# Patient Record
Sex: FEMALE | Race: WHITE | NOT HISPANIC OR LATINO | Employment: FULL TIME | URBAN - METROPOLITAN AREA
[De-identification: names, ages, dates, MRNs, and addresses within clinical notes are randomized per-mention and may not be internally consistent; named-entity substitution may affect disease eponyms.]

---

## 2023-03-24 LAB — SARS-COV-2 RESULT: NOT DETECTED

## 2023-11-21 ENCOUNTER — LAB REQUISITION (OUTPATIENT)
Dept: LAB | Facility: HOSPITAL | Age: 53
End: 2023-11-21
Payer: COMMERCIAL

## 2023-11-21 LAB — SARS-COV-2 RNA RESP QL NAA+PROBE: DETECTED

## 2023-11-21 PROCEDURE — 87635 SARS-COV-2 COVID-19 AMP PRB: CPT

## 2023-11-29 ENCOUNTER — TELEPHONE (OUTPATIENT)
Dept: DERMATOLOGY | Facility: CLINIC | Age: 53
End: 2023-11-29
Payer: COMMERCIAL

## 2023-12-01 ENCOUNTER — OFFICE VISIT (OUTPATIENT)
Dept: DERMATOLOGY | Facility: CLINIC | Age: 53
End: 2023-12-01
Payer: COMMERCIAL

## 2023-12-01 DIAGNOSIS — D48.5 NEOPLASM OF UNCERTAIN BEHAVIOR OF SKIN: Primary | ICD-10-CM

## 2023-12-01 DIAGNOSIS — D18.01 HEMANGIOMA OF SKIN: ICD-10-CM

## 2023-12-01 DIAGNOSIS — D22.4 MELANOCYTIC NEVUS OF NECK: ICD-10-CM

## 2023-12-01 DIAGNOSIS — L81.4 LENTIGO: ICD-10-CM

## 2023-12-01 DIAGNOSIS — L57.8 DIFFUSE PHOTODAMAGE OF SKIN: ICD-10-CM

## 2023-12-01 DIAGNOSIS — L73.9 FOLLICULITIS: ICD-10-CM

## 2023-12-01 PROCEDURE — 99214 OFFICE O/P EST MOD 30 MIN: CPT | Performed by: DERMATOLOGY

## 2023-12-01 PROCEDURE — 88305 TISSUE EXAM BY PATHOLOGIST: CPT | Performed by: DERMATOLOGY

## 2023-12-01 PROCEDURE — 88305 TISSUE EXAM BY PATHOLOGIST: CPT | Mod: TC,DER | Performed by: DERMATOLOGY

## 2023-12-01 PROCEDURE — 11301 SHAVE SKIN LESION 0.6-1.0 CM: CPT | Performed by: DERMATOLOGY

## 2023-12-01 RX ORDER — ACETAMINOPHEN 500 MG
1 TABLET ORAL DAILY
COMMUNITY

## 2023-12-01 RX ORDER — ESTRADIOL 0.05 MG/D
PATCH TRANSDERMAL
COMMUNITY
Start: 2013-06-08 | End: 2024-05-16 | Stop reason: SDUPTHER

## 2023-12-01 RX ORDER — ATORVASTATIN CALCIUM 10 MG/1
10 TABLET, FILM COATED ORAL EVERY OTHER DAY
COMMUNITY
Start: 2023-06-26

## 2023-12-01 ASSESSMENT — DERMATOLOGY QUALITY OF LIFE (QOL) ASSESSMENT: ARE THERE EXCLUSIONS OR EXCEPTIONS FOR THE QUALITY OF LIFE ASSESSMENT: NO

## 2023-12-01 ASSESSMENT — DERMATOLOGY PATIENT ASSESSMENT
DO YOU USE A TANNING BED: YES, PREVIOUSLY
DO YOU USE SUNSCREEN: OCCASIONALLY
WHAT TYPE OF BIRTH CONTROL: PATCH
DO YOU HAVE IRREGULAR MENSTRUAL CYCLES: NO
ARE YOU ON BIRTH CONTROL: YES
WHERE ARE THESE NEW OR CHANGING LESIONS LOCATED: R SHOULDER
ARE YOU TRYING TO GET PREGNANT: NO
DO YOU HAVE ANY NEW OR CHANGING LESIONS: YES

## 2023-12-02 RX ORDER — CLINDAMYCIN PHOSPHATE 10 UG/ML
LOTION TOPICAL 2 TIMES DAILY
Qty: 60 ML | Refills: 11 | Status: SHIPPED | OUTPATIENT
Start: 2023-12-02 | End: 2024-12-01

## 2023-12-02 NOTE — PROGRESS NOTES
Subjective     Mindy Jha is a 53 y.o. female who presents for the following: Suspicious Skin Lesion (On right shoulder).  She states the brown spot on her right shoulder has been present for 1 year and has increased in size over that time and gotten darker in color.  It is asymptomatic with no associated bleeding, itching, or pain.  She also notes intermittent pimple-like breakouts on her neck and chest.  She denies any other new, changing, or concerning skin lesions; no bleeding, itching, or burning lesions.      Review of Systems:  No other skin or systemic complaints other than what is documented elsewhere in the note.    The following portions of the chart were reviewed this encounter and updated as appropriate:       Skin Cancer History  No skin cancer on file.    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     Allergies:  Oxycodone-acetaminophen and Azithromycin    Current Medications / CAM's:    Current Outpatient Medications:     atorvastatin (Lipitor) 10 mg tablet, Take 1 tablet (10 mg) by mouth every other day., Disp: , Rfl:     estradiol (Climara) 0.05 mg/24 hr patch, Place on the skin., Disp: , Rfl:     calcium carbonate-vitamin D3 600 mg-20 mcg (800 unit) tablet, Take 1 tablet by mouth once daily., Disp: , Rfl:     clindamycin (Cleocin T) 1 % lotion, Apply topically 2 times a day., Disp: 60 mL, Rfl: 11     Objective   Well appearing patient in no apparent distress; mood and affect are within normal limits.    A skin examination was performed including: Face, neck, chest, and bilateral shoulders.  She declined examination of any other parts of her skin today.  All findings within normal limits unless otherwise noted below.    Assessment/Plan   1.  Neoplasm of uncertain behavior of skin  Right Proximal Shoulder  6 mm dark brown pigmented, asymmetric, changing papule with an asymmetric pigment network and irregular borders           Shave removal    Lesion length (cm):  0.6  Margin per side (cm):  0  Lesion diameter (cm):  0.6  Informed consent: discussed and consent obtained    Timeout: patient name, date of birth, surgical site, and procedure verified    Procedure prep:  Patient was prepped and draped  Anesthesia: the lesion was anesthetized in a standard fashion    Anesthetic:  1% lidocaine w/ epinephrine 1-100,000 local infiltration  Instrument used: flexible razor blade    Hemostasis achieved with: aluminum chloride    Outcome: patient tolerated procedure well    Post-procedure details: sterile dressing applied and wound care instructions given    Dressing type: bandage and petrolatum      Staff Communication: Dermatology Local Anesthesia: 1 % Lidocaine / Epinephrine - Amount:0.5ml    Specimen 1 - Dermatopathology- DERM LAB  Differential Diagnosis: SK v DN  Check Margins Yes/No?:    Comments:    Dermpath Lab: Routine Histopathology (formalin-fixed tissue)    2. Folliculitis  Chest - Medial (Center)  Scattered on the patient's neck and chest, there are several follicular-based erythematous, inflammatory papules and pustules    Folliculitis -neck and chest.  The bacterial nature of this condition and treatment options were discussed with the patient today.  At this time, I recommend topical antibiotic therapy with Clindamycin 1% lotion, which the patient was instructed to apply twice daily to the affected areas or up to 3-4 times per day as needed for active lesions.  The risks, benefits, and side effects of this medication were discussed.  The patient expressed understanding and is in agreement with this plan.    clindamycin (Cleocin T) 1 % lotion - Chest - Medial (Center)  Apply topically 2 times a day.    3. Melanocytic nevus of neck  Scattered on the  patient's face, neck, chest, and bilateral shoulders, there are several small, round- to oval-shaped, brown-pigmented and pink-colored, symmetric, uniform-appearing macules and dome-shaped papules    Clinically benign- to slightly atypical-appearing nevi - the clinically benign- to slightly atypical-appearing nature of the remainder of the patient's nevi was discussed with the patient today.  None of the patient's nevi, with the exception of the one noted above, meet threshold for biopsy today.  I emphasized the importance of performing monthly self-skin exams using the ABCDs of monitoring moles, which were reviewed with the patient today and an informational hand-out provided.  I also emphasized the importance of sun avoidance and sun protection with daily sunscreen use.    4. Hemangioma of skin  Scattered on the patient's face, neck, chest, and bilateral shoulders, there are several small, round, cherry red- to purplish-colored, symmetric, uniform, vascular-appearing macules and papules    Cherry Angiomas - the benign nature of these vascular lesions was discussed with the patient today and reassurance provided.  No treatment is medically indicated for these lesions at this time.    5. Lentigo  Photodistributed  Multiple tan- to light brown-colored, round- to oval-shaped, symmetric and uniform-appearing macules and small patches consistent with lentigines scattered in sun-exposed areas.    Solar Lentigines and photodamage.  The clinically benign-appearing nature of these lesions and their relation to chronic sun exposure were discussed with the patient today and reassurance provided.  None of these lesions meet threshold for biopsy today, and thus no treatment is medically indicated for these lesions at this time.  The signs and symptoms of skin cancer were reviewed and the patient was advised to practice sun protection and sun avoidance, use daily sunscreen, and perform regular self skin exams.  The patient was  instructed to monitor these lesions for any changes, such as in size, shape, or color, or associated symptoms and to call our office to schedule a return visit for re-evaluation if any such changes or symptoms are noticed in the future.  The patient expressed understanding and is in agreement with this plan.    6. Diffuse photodamage of skin  Photodistributed  Diffuse photodamage with actinic changes with telangiectasia and mottled pigmentation in sun-exposed areas.    Photodamage.  The signs and symptoms of skin cancer were reviewed and the patient was advised to practice sun protection and sun avoidance, use daily sunscreen, and perform regular self skin exams.  Sun protection was discussed, including avoiding the mid-day sun, wearing a sunscreen with SPF at least 50, and stressing the need for reapplication of sunscreen and applying more than they think they need.

## 2023-12-05 LAB
LABORATORY COMMENT REPORT: NORMAL
PATH REPORT.FINAL DX SPEC: NORMAL
PATH REPORT.GROSS SPEC: NORMAL
PATH REPORT.MICROSCOPIC SPEC OTHER STN: NORMAL
PATH REPORT.RELEVANT HX SPEC: NORMAL
PATH REPORT.TOTAL CANCER: NORMAL

## 2024-04-10 ENCOUNTER — TELEPHONE (OUTPATIENT)
Dept: DERMATOLOGY | Facility: CLINIC | Age: 54
End: 2024-04-10
Payer: COMMERCIAL

## 2024-04-11 ENCOUNTER — APPOINTMENT (OUTPATIENT)
Dept: DERMATOLOGY | Facility: CLINIC | Age: 54
End: 2024-04-11
Payer: COMMERCIAL

## 2024-05-16 ENCOUNTER — OFFICE VISIT (OUTPATIENT)
Dept: OBSTETRICS AND GYNECOLOGY | Facility: CLINIC | Age: 54
End: 2024-05-16
Payer: COMMERCIAL

## 2024-05-16 VITALS
DIASTOLIC BLOOD PRESSURE: 81 MMHG | SYSTOLIC BLOOD PRESSURE: 159 MMHG | BODY MASS INDEX: 28.66 KG/M2 | WEIGHT: 172 LBS | HEIGHT: 65 IN

## 2024-05-16 DIAGNOSIS — N95.1 MENOPAUSAL SYMPTOMS: ICD-10-CM

## 2024-05-16 DIAGNOSIS — Z12.31 ENCOUNTER FOR SCREENING MAMMOGRAM FOR BREAST CANCER: ICD-10-CM

## 2024-05-16 DIAGNOSIS — Z01.419 ENCOUNTER FOR ANNUAL ROUTINE GYNECOLOGICAL EXAMINATION: Primary | ICD-10-CM

## 2024-05-16 LAB
POC BLOOD, URINE: NEGATIVE
POC GLUCOSE, URINE: NEGATIVE MG/DL
POC KETONES, URINE: NEGATIVE MG/DL
POC LEUKOCYTES, URINE: NEGATIVE
POC NITRITE,URINE: NEGATIVE
POC PROTEIN, URINE: NEGATIVE MG/DL

## 2024-05-16 PROCEDURE — 99396 PREV VISIT EST AGE 40-64: CPT | Performed by: OBSTETRICS & GYNECOLOGY

## 2024-05-16 PROCEDURE — 88175 CYTOPATH C/V AUTO FLUID REDO: CPT

## 2024-05-16 PROCEDURE — 87624 HPV HI-RISK TYP POOLED RSLT: CPT

## 2024-05-16 PROCEDURE — 81003 URINALYSIS AUTO W/O SCOPE: CPT | Performed by: OBSTETRICS & GYNECOLOGY

## 2024-05-16 RX ORDER — ESTRADIOL 0.05 MG/D
1 PATCH TRANSDERMAL
Qty: 12 PATCH | Refills: 3 | Status: SHIPPED | OUTPATIENT
Start: 2024-05-19 | End: 2025-05-19

## 2024-05-16 ASSESSMENT — PAIN SCALES - GENERAL: PAINLEVEL: 0-NO PAIN

## 2024-05-16 ASSESSMENT — ENCOUNTER SYMPTOMS
ENDOCRINE NEGATIVE: 0
EYES NEGATIVE: 0
LOSS OF SENSATION IN FEET: 0
NEUROLOGICAL NEGATIVE: 0
CONSTITUTIONAL NEGATIVE: 0
RESPIRATORY NEGATIVE: 0
GASTROINTESTINAL NEGATIVE: 0
CARDIOVASCULAR NEGATIVE: 0
HEMATOLOGIC/LYMPHATIC NEGATIVE: 0
MUSCULOSKELETAL NEGATIVE: 0
ALLERGIC/IMMUNOLOGIC NEGATIVE: 0
DEPRESSION: 0
PSYCHIATRIC NEGATIVE: 0
OCCASIONAL FEELINGS OF UNSTEADINESS: 0

## 2024-05-16 NOTE — PROGRESS NOTES
Subjective   Patient ID: Mindy Jha is a 53 y.o. female who presents for Annual Exam (Annual exam last pap  wnl HPV negative last mammogram 23 benign ).  GAGAN Guillen is a 53-year-old female  2 para 2 well-known to the practice here for her annual exam.  She is postmenopausal and a previous supracervical hysterectomy.  Happy with her transdermal estradiol patches.  Previously has been on Climara due to possible skin reaction to the generic.  They recently come expensive and would like to again try the generics.  She denies any urinary or bowel symptoms.  She retains her cervix and is due for Pap test  Also due for mammogram  Review of Systems   All other systems reviewed and are negative.      Objective   Physical Exam  Thyroid: No thyroid megaly    Cardiovascular: Regular rate and rhythm    Lungs: Clear to auscultation    Breasts: No skin changes no masses palpated    Abdomen: Soft nontender bowel sounds positive no masses palpated    Extremities nontender no edema    Pelvic exam: External genitalia [place]'s urethra and [place]'s are normal.  Vaginal exam shows no lesions or discharge.  Pelvic bimanual exam reveals no masses or tenderness.  Assessment/Plan   Normal annual exam  Pap smear performed and mammogram ordered  Continue estradiol patches  Follow-up in 1 year or as needed         Viktor Kingsley MD 24 2:25 PM

## 2024-05-24 LAB
CYTOLOGY CMNT CVX/VAG CYTO-IMP: NORMAL
HPV HR 12 DNA GENITAL QL NAA+PROBE: NEGATIVE
HPV HR GENOTYPES PNL CVX NAA+PROBE: NEGATIVE
HPV16 DNA SPEC QL NAA+PROBE: NEGATIVE
HPV18 DNA SPEC QL NAA+PROBE: NEGATIVE
LAB AP HPV GENOTYPE QUESTION: YES
LAB AP HPV HR: NORMAL
LABORATORY COMMENT REPORT: NORMAL
PATH REPORT.TOTAL CANCER: NORMAL

## 2024-05-28 ENCOUNTER — APPOINTMENT (OUTPATIENT)
Dept: RADIOLOGY | Facility: HOSPITAL | Age: 54
End: 2024-05-28
Payer: COMMERCIAL

## 2024-05-28 ENCOUNTER — HOSPITAL ENCOUNTER (OUTPATIENT)
Dept: RADIOLOGY | Facility: HOSPITAL | Age: 54
Discharge: HOME | End: 2024-05-28
Payer: COMMERCIAL

## 2024-05-28 VITALS — WEIGHT: 171.96 LBS | HEIGHT: 65 IN | BODY MASS INDEX: 28.65 KG/M2

## 2024-05-28 DIAGNOSIS — Z01.419 ENCOUNTER FOR ANNUAL ROUTINE GYNECOLOGICAL EXAMINATION: ICD-10-CM

## 2024-05-28 DIAGNOSIS — Z12.31 ENCOUNTER FOR SCREENING MAMMOGRAM FOR BREAST CANCER: ICD-10-CM

## 2024-05-28 PROCEDURE — 77067 SCR MAMMO BI INCL CAD: CPT

## 2024-05-28 PROCEDURE — 77067 SCR MAMMO BI INCL CAD: CPT | Performed by: RADIOLOGY

## 2024-05-28 PROCEDURE — 77063 BREAST TOMOSYNTHESIS BI: CPT | Performed by: RADIOLOGY

## 2024-07-30 ENCOUNTER — APPOINTMENT (OUTPATIENT)
Dept: DERMATOLOGY | Facility: CLINIC | Age: 54
End: 2024-07-30
Payer: COMMERCIAL

## 2024-07-30 DIAGNOSIS — D22.9 MULTIPLE BENIGN NEVI: Primary | ICD-10-CM

## 2024-07-30 DIAGNOSIS — Z12.83 SCREENING EXAM FOR SKIN CANCER: ICD-10-CM

## 2024-07-30 DIAGNOSIS — L82.1 SEBORRHEIC KERATOSIS: ICD-10-CM

## 2024-07-30 DIAGNOSIS — L57.8 ACTINIC SKIN DAMAGE: ICD-10-CM

## 2024-07-30 DIAGNOSIS — L70.0 ACNE VULGARIS: ICD-10-CM

## 2024-07-30 DIAGNOSIS — L81.4 LENTIGO: ICD-10-CM

## 2024-07-30 DIAGNOSIS — L73.9 FOLLICULITIS: ICD-10-CM

## 2024-07-30 PROCEDURE — 99214 OFFICE O/P EST MOD 30 MIN: CPT | Performed by: DERMATOLOGY

## 2024-07-30 PROCEDURE — 1036F TOBACCO NON-USER: CPT | Performed by: DERMATOLOGY

## 2024-07-30 RX ORDER — CLINDAMYCIN PHOSPHATE 10 UG/ML
LOTION TOPICAL 2 TIMES DAILY
Qty: 60 ML | Refills: 11 | Status: SHIPPED | OUTPATIENT
Start: 2024-07-30 | End: 2025-07-30

## 2024-07-30 ASSESSMENT — DERMATOLOGY QUALITY OF LIFE (QOL) ASSESSMENT
RATE HOW BOTHERED YOU ARE BY SYMPTOMS OF YOUR SKIN PROBLEM (EG, ITCHING, STINGING BURNING, HURTING OR SKIN IRRITATION): 0 - NEVER BOTHERED
RATE HOW BOTHERED YOU ARE BY EFFECTS OF YOUR SKIN PROBLEMS ON YOUR ACTIVITIES (EG, GOING OUT, ACCOMPLISHING WHAT YOU WANT, WORK ACTIVITIES OR YOUR RELATIONSHIPS WITH OTHERS): 0 - NEVER BOTHERED
RATE HOW EMOTIONALLY BOTHERED YOU ARE BY YOUR SKIN PROBLEM (FOR EXAMPLE, WORRY, EMBARRASSMENT, FRUSTRATION): 0 - NEVER BOTHERED
RATE HOW EMOTIONALLY BOTHERED YOU ARE BY YOUR SKIN PROBLEM (FOR EXAMPLE, WORRY, EMBARRASSMENT, FRUSTRATION): 0 - NEVER BOTHERED
ARE THERE EXCLUSIONS OR EXCEPTIONS FOR THE QUALITY OF LIFE ASSESSMENT: NO
RATE HOW BOTHERED YOU ARE BY EFFECTS OF YOUR SKIN PROBLEMS ON YOUR ACTIVITIES (EG, GOING OUT, ACCOMPLISHING WHAT YOU WANT, WORK ACTIVITIES OR YOUR RELATIONSHIPS WITH OTHERS): 0 - NEVER BOTHERED
RATE HOW BOTHERED YOU ARE BY EFFECTS OF YOUR SKIN PROBLEMS ON YOUR ACTIVITIES (EG, GOING OUT, ACCOMPLISHING WHAT YOU WANT, WORK ACTIVITIES OR YOUR RELATIONSHIPS WITH OTHERS): 0 - NEVER BOTHERED
DATE THE QUALITY-OF-LIFE ASSESSMENT WAS COMPLETED: 67051
WHAT SINGLE SKIN CONDITION LISTED BELOW IS THE PATIENT ANSWERING THE QUALITY-OF-LIFE ASSESSMENT QUESTIONS ABOUT: NONE OF THE ABOVE
WHAT SINGLE SKIN CONDITION LISTED BELOW IS THE PATIENT ANSWERING THE QUALITY-OF-LIFE ASSESSMENT QUESTIONS ABOUT: NONE OF THE ABOVE
RATE HOW BOTHERED YOU ARE BY SYMPTOMS OF YOUR SKIN PROBLEM (EG, ITCHING, STINGING BURNING, HURTING OR SKIN IRRITATION): 0 - NEVER BOTHERED
RATE HOW EMOTIONALLY BOTHERED YOU ARE BY YOUR SKIN PROBLEM (FOR EXAMPLE, WORRY, EMBARRASSMENT, FRUSTRATION): 0 - NEVER BOTHERED
RATE HOW BOTHERED YOU ARE BY SYMPTOMS OF YOUR SKIN PROBLEM (EG, ITCHING, STINGING BURNING, HURTING OR SKIN IRRITATION): 0 - NEVER BOTHERED

## 2024-07-30 ASSESSMENT — DERMATOLOGY PATIENT ASSESSMENT
ARE YOU AN ORGAN TRANSPLANT RECIPIENT: NO
ARE YOU ON BIRTH CONTROL: NO
DO YOU HAVE ANY NEW OR CHANGING LESIONS: NO
DO YOU USE SUNSCREEN: OCCASIONALLY
HAVE YOU HAD OR DO YOU HAVE VASCULAR DISEASE: NO
DO YOU USE A TANNING BED: YES, PREVIOUSLY
HAVE YOU HAD OR DO YOU HAVE A STAPH INFECTION: NO
DO YOU HAVE IRREGULAR MENSTRUAL CYCLES: NO
ARE YOU TRYING TO GET PREGNANT: NO

## 2024-07-30 ASSESSMENT — PATIENT GLOBAL ASSESSMENT (PGA): PATIENT GLOBAL ASSESSMENT: PATIENT GLOBAL ASSESSMENT:  1 - CLEAR

## 2024-07-30 ASSESSMENT — ITCH NUMERIC RATING SCALE: HOW SEVERE IS YOUR ITCHING?: 0

## 2024-07-30 NOTE — PATIENT INSTRUCTIONS
For skin care products    [employer] corina and website can be a good guide    CeraVe is made for people with eczema  Vanicream is made for people with allergies to skin care products    Pimple patches can help for flares

## 2024-07-30 NOTE — LETTER
July 30, 2024     No Recipients    Patient: Mindy Jha   YOB: 1970   Date of Visit: 7/30/2024       Dear Dr. Muñoz Recipients:    Thank you for referring Mindy Jha to me for evaluation. Below are my notes for this consultation.  If you have questions, please do not hesitate to call me. I look forward to following your patient along with you.       Sincerely,     Luz Maria Duarte MD      CC: No Recipients  ______________________________________________________________________________________    Subjective    Mindy Jha is a 53 y.o. female who presents for the following: Skin Check.     Last derm visit 12/1/23 with Dr. Singh, shave removal of seborrheic keratosis on right shoulder. The rest of his exam was normal    Review of Systems:  No other skin or systemic complaints other than what is documented elsewhere in the note.    The following portions of the chart were reviewed this encounter and updated as appropriate:         Skin Cancer History  No skin cancer on file.      Specialty Problems    None       Objective  Well appearing patient in no apparent distress; mood and affect are within normal limits.    A full examination was performed including scalp, head, eyes, ears, nose, lips, neck, chest, axillae, abdomen, back, buttocks, bilateral upper extremities, bilateral lower extremities, hands, feet, fingers, toes, fingernails, and toenails. All findings within normal limits unless otherwise noted below.    Assessment/Plan  1. Multiple benign nevi  Brown and tan macules and papules with reassuring findings on dermoscopy    -These lesions have benign, reassuring patterns on dermoscopy  -Recommend continued self observation, and to contact the office if any changes in nevi are noticed    2. Lentigo  Tan macules    -Benign appearing on exam  -Reassurance, recommend observation    3. Seborrheic keratosis  Stuck on, waxy macule(s)/papule(s)/plaque(s) with comedo-like openings and  milia like cysts    -Discussed the nature of the diagnosis  -Reassurance, recommend continued observation    4. Actinic skin damage  Background of photodamage with hyper- and hypo-pigmented macules on the skin    5. Screening exam for skin cancer  As part of a routine Full Skin Exam, a genital examination with the presence of a chaperone was offered. The patient declined the exam and declined the chaperone. Saw amauri recently      Full body skin exam  -No lesions concerning for malignancy on the remainder the skin exam today   - The ugly duckling sign was discussed. Monitor for any skin lesions that are different in color, shape, or size than others on body  -Sun protection was discussed. Recommend SPF 30+, hats with brims, sun protective clothing, and avoiding sun exposure between 10 AM and 2 PM whenever possible  -Recommend regular skin exams or sooner if new or changing lesions       Related Procedures  Follow Up In Dermatology - Established Patient    6. Acne vulgaris  Head - Anterior (Face)  A few pink inflammatory papules on chin, background of erythema    -Discussed diagnosis of acne  - occasional flare  - uses Differin, if uses too much it can be drying    - discussed gentle face washes  - add clindamycin lotion and/or pimple patches for flares    -Discussed natural history of condition and expectations for treatment  -Recommend:    7. Folliculitis    Related Medications  clindamycin (Cleocin T) 1 % lotion  Apply topically 2 times a day. To new acne bumps as needed        Follow up 1 year Full Skin Exam

## 2024-07-30 NOTE — PROGRESS NOTES
Subjective     iMndy Jha is a 53 y.o. female who presents for the following: Skin Check.     Last derm visit 12/1/23 with Dr. Singh, shave removal of seborrheic keratosis on right shoulder. The rest of his exam was normal    Review of Systems:  No other skin or systemic complaints other than what is documented elsewhere in the note.    The following portions of the chart were reviewed this encounter and updated as appropriate:         Skin Cancer History  No skin cancer on file.      Specialty Problems    None       Objective   Well appearing patient in no apparent distress; mood and affect are within normal limits.    A full examination was performed including scalp, head, eyes, ears, nose, lips, neck, chest, axillae, abdomen, back, buttocks, bilateral upper extremities, bilateral lower extremities, hands, feet, fingers, toes, fingernails, and toenails. All findings within normal limits unless otherwise noted below.    Assessment/Plan   1. Multiple benign nevi  Brown and tan macules and papules with reassuring findings on dermoscopy    -These lesions have benign, reassuring patterns on dermoscopy  -Recommend continued self observation, and to contact the office if any changes in nevi are noticed    2. Lentigo  Tan macules    -Benign appearing on exam  -Reassurance, recommend observation    3. Seborrheic keratosis  Stuck on, waxy macule(s)/papule(s)/plaque(s) with comedo-like openings and milia like cysts    -Discussed the nature of the diagnosis  -Reassurance, recommend continued observation    4. Actinic skin damage  Background of photodamage with hyper- and hypo-pigmented macules on the skin    5. Screening exam for skin cancer  As part of a routine Full Skin Exam, a genital examination with the presence of a chaperone was offered. The patient declined the exam and declined the chaperone. Saw gyne recently      Full body skin exam  -No lesions concerning for malignancy on the remainder the skin exam  today   - The ugly duckling sign was discussed. Monitor for any skin lesions that are different in color, shape, or size than others on body  -Sun protection was discussed. Recommend SPF 30+, hats with brims, sun protective clothing, and avoiding sun exposure between 10 AM and 2 PM whenever possible  -Recommend regular skin exams or sooner if new or changing lesions       Related Procedures  Follow Up In Dermatology - Established Patient    6. Acne vulgaris  Head - Anterior (Face)  A few pink inflammatory papules on chin, background of erythema    -Discussed diagnosis of acne  - occasional flare  - uses Differin, if uses too much it can be drying    - discussed gentle face washes  - add clindamycin lotion and/or pimple patches for flares    -Discussed natural history of condition and expectations for treatment  -Recommend:    7. Folliculitis    Related Medications  clindamycin (Cleocin T) 1 % lotion  Apply topically 2 times a day. To new acne bumps as needed        Follow up 1 year Full Skin Exam

## 2025-02-25 ENCOUNTER — TELEPHONE (OUTPATIENT)
Dept: DERMATOLOGY | Facility: CLINIC | Age: 55
End: 2025-02-25
Payer: COMMERCIAL

## 2025-05-17 DIAGNOSIS — N95.1 MENOPAUSAL SYMPTOMS: ICD-10-CM

## 2025-05-22 RX ORDER — ESTRADIOL 0.05 MG/D
PATCH TRANSDERMAL
Qty: 12 PATCH | Refills: 0 | Status: SHIPPED | OUTPATIENT
Start: 2025-05-22

## 2025-06-10 ENCOUNTER — APPOINTMENT (OUTPATIENT)
Dept: DERMATOLOGY | Facility: CLINIC | Age: 55
End: 2025-06-10
Payer: COMMERCIAL

## 2025-06-19 ENCOUNTER — HOSPITAL ENCOUNTER (OUTPATIENT)
Dept: RADIOLOGY | Facility: HOSPITAL | Age: 55
Discharge: HOME | End: 2025-06-19
Payer: COMMERCIAL

## 2025-06-19 ENCOUNTER — OFFICE VISIT (OUTPATIENT)
Dept: ORTHOPEDIC SURGERY | Facility: HOSPITAL | Age: 55
End: 2025-06-19
Payer: COMMERCIAL

## 2025-06-19 ENCOUNTER — APPOINTMENT (OUTPATIENT)
Dept: OBSTETRICS AND GYNECOLOGY | Facility: CLINIC | Age: 55
End: 2025-06-19
Payer: COMMERCIAL

## 2025-06-19 ENCOUNTER — APPOINTMENT (OUTPATIENT)
Dept: OBSTETRICS AND GYNECOLOGY | Facility: HOSPITAL | Age: 55
End: 2025-06-19
Payer: COMMERCIAL

## 2025-06-19 VITALS — WEIGHT: 173.7 LBS | HEIGHT: 65 IN | BODY MASS INDEX: 28.94 KG/M2

## 2025-06-19 DIAGNOSIS — M25.561 RIGHT KNEE PAIN, UNSPECIFIED CHRONICITY: ICD-10-CM

## 2025-06-19 DIAGNOSIS — M25.551 RIGHT HIP PAIN: ICD-10-CM

## 2025-06-19 DIAGNOSIS — M16.11 PRIMARY OSTEOARTHRITIS OF RIGHT HIP: ICD-10-CM

## 2025-06-19 DIAGNOSIS — M70.61 TROCHANTERIC BURSITIS OF RIGHT HIP: ICD-10-CM

## 2025-06-19 PROCEDURE — 1036F TOBACCO NON-USER: CPT | Performed by: ORTHOPAEDIC SURGERY

## 2025-06-19 PROCEDURE — 99202 OFFICE O/P NEW SF 15 MIN: CPT | Performed by: ORTHOPAEDIC SURGERY

## 2025-06-19 PROCEDURE — 3008F BODY MASS INDEX DOCD: CPT | Performed by: ORTHOPAEDIC SURGERY

## 2025-06-19 PROCEDURE — 99204 OFFICE O/P NEW MOD 45 MIN: CPT | Performed by: ORTHOPAEDIC SURGERY

## 2025-06-19 PROCEDURE — 73564 X-RAY EXAM KNEE 4 OR MORE: CPT | Mod: 50

## 2025-06-19 PROCEDURE — 73502 X-RAY EXAM HIP UNI 2-3 VIEWS: CPT | Mod: RT

## 2025-06-19 RX ORDER — DICLOFENAC SODIUM 75 MG/1
75 TABLET, DELAYED RELEASE ORAL 2 TIMES DAILY PRN
Qty: 30 TABLET | Refills: 0 | Status: SHIPPED | OUTPATIENT
Start: 2025-06-19

## 2025-06-19 RX ORDER — AMLODIPINE BESYLATE AND ATORVASTATIN CALCIUM 5; 10 MG/1; MG/1
1 TABLET, FILM COATED ORAL
COMMUNITY
Start: 2025-02-27

## 2025-06-19 ASSESSMENT — PAIN - FUNCTIONAL ASSESSMENT: PAIN_FUNCTIONAL_ASSESSMENT: NO/DENIES PAIN
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
## 2025-06-23 ENCOUNTER — APPOINTMENT (OUTPATIENT)
Dept: ORTHOPEDIC SURGERY | Age: 55
End: 2025-06-23
Payer: COMMERCIAL

## 2025-06-26 ENCOUNTER — APPOINTMENT (OUTPATIENT)
Dept: DERMATOLOGY | Facility: CLINIC | Age: 55
End: 2025-06-26
Payer: COMMERCIAL

## 2025-07-24 ENCOUNTER — APPOINTMENT (OUTPATIENT)
Dept: OBSTETRICS AND GYNECOLOGY | Facility: CLINIC | Age: 55
End: 2025-07-24
Payer: COMMERCIAL

## 2025-07-24 VITALS
DIASTOLIC BLOOD PRESSURE: 78 MMHG | SYSTOLIC BLOOD PRESSURE: 156 MMHG | BODY MASS INDEX: 29.16 KG/M2 | WEIGHT: 175 LBS | HEIGHT: 65 IN

## 2025-07-24 DIAGNOSIS — E28.39 HYPOESTROGENISM: ICD-10-CM

## 2025-07-24 DIAGNOSIS — Z01.419 ENCOUNTER FOR ANNUAL ROUTINE GYNECOLOGICAL EXAMINATION: Primary | ICD-10-CM

## 2025-07-24 DIAGNOSIS — N95.1 MENOPAUSAL SYMPTOMS: ICD-10-CM

## 2025-07-24 DIAGNOSIS — Z12.31 ENCOUNTER FOR SCREENING MAMMOGRAM FOR BREAST CANCER: ICD-10-CM

## 2025-07-24 PROCEDURE — 3008F BODY MASS INDEX DOCD: CPT | Performed by: OBSTETRICS & GYNECOLOGY

## 2025-07-24 PROCEDURE — 81003 URINALYSIS AUTO W/O SCOPE: CPT | Performed by: OBSTETRICS & GYNECOLOGY

## 2025-07-24 PROCEDURE — 99396 PREV VISIT EST AGE 40-64: CPT | Performed by: OBSTETRICS & GYNECOLOGY

## 2025-07-24 RX ORDER — DICLOFENAC SODIUM 10 MG/G
4 GEL TOPICAL 4 TIMES DAILY
COMMUNITY
Start: 2024-10-24

## 2025-07-24 SDOH — ECONOMIC STABILITY: FOOD INSECURITY: WITHIN THE PAST 12 MONTHS, THE FOOD YOU BOUGHT JUST DIDN'T LAST AND YOU DIDN'T HAVE MONEY TO GET MORE.: NEVER TRUE

## 2025-07-24 SDOH — ECONOMIC STABILITY: FOOD INSECURITY: WITHIN THE PAST 12 MONTHS, YOU WORRIED THAT YOUR FOOD WOULD RUN OUT BEFORE YOU GOT MONEY TO BUY MORE.: NEVER TRUE

## 2025-07-24 SDOH — ECONOMIC STABILITY: TRANSPORTATION INSECURITY
IN THE PAST 12 MONTHS, HAS THE LACK OF TRANSPORTATION KEPT YOU FROM MEDICAL APPOINTMENTS OR FROM GETTING MEDICATIONS?: NO

## 2025-07-24 SDOH — ECONOMIC STABILITY: TRANSPORTATION INSECURITY
IN THE PAST 12 MONTHS, HAS LACK OF TRANSPORTATION KEPT YOU FROM MEETINGS, WORK, OR FROM GETTING THINGS NEEDED FOR DAILY LIVING?: NO

## 2025-07-24 ASSESSMENT — ENCOUNTER SYMPTOMS
NEUROLOGICAL NEGATIVE: 0
LOSS OF SENSATION IN FEET: 0
UNEXPECTED WEIGHT CHANGE: 1
CONSTITUTIONAL NEGATIVE: 0
EYES NEGATIVE: 0
PSYCHIATRIC NEGATIVE: 0
ENDOCRINE NEGATIVE: 0
DEPRESSION: 0
CARDIOVASCULAR NEGATIVE: 0
OCCASIONAL FEELINGS OF UNSTEADINESS: 0
HEMATOLOGIC/LYMPHATIC NEGATIVE: 0
ALLERGIC/IMMUNOLOGIC NEGATIVE: 0
GASTROINTESTINAL NEGATIVE: 0
RESPIRATORY NEGATIVE: 0
MUSCULOSKELETAL NEGATIVE: 0

## 2025-07-24 ASSESSMENT — LIFESTYLE VARIABLES
HOW OFTEN DO YOU HAVE A DRINK CONTAINING ALCOHOL: 4 OR MORE TIMES A WEEK
AUDIT-C TOTAL SCORE: 7
HOW OFTEN DO YOU HAVE SIX OR MORE DRINKS ON ONE OCCASION: MONTHLY
HOW MANY STANDARD DRINKS CONTAINING ALCOHOL DO YOU HAVE ON A TYPICAL DAY: 3 OR 4
SKIP TO QUESTIONS 9-10: 0

## 2025-07-24 ASSESSMENT — SOCIAL DETERMINANTS OF HEALTH (SDOH)
HOW HARD IS IT FOR YOU TO PAY FOR THE VERY BASICS LIKE FOOD, HOUSING, MEDICAL CARE, AND HEATING?: NOT VERY HARD
WITHIN THE LAST YEAR, HAVE TO BEEN RAPED OR FORCED TO HAVE ANY KIND OF SEXUAL ACTIVITY BY YOUR PARTNER OR EX-PARTNER?: NO
WITHIN THE LAST YEAR, HAVE YOU BEEN AFRAID OF YOUR PARTNER OR EX-PARTNER?: NO
WITHIN THE LAST YEAR, HAVE YOU BEEN HUMILIATED OR EMOTIONALLY ABUSED IN OTHER WAYS BY YOUR PARTNER OR EX-PARTNER?: NO
WITHIN THE LAST YEAR, HAVE YOU BEEN KICKED, HIT, SLAPPED, OR OTHERWISE PHYSICALLY HURT BY YOUR PARTNER OR EX-PARTNER?: NO

## 2025-07-24 ASSESSMENT — PAIN SCALES - GENERAL: PAINLEVEL_OUTOF10: 0-NO PAIN

## 2025-07-24 NOTE — PROGRESS NOTES
Subjective   Patient ID: Mindy Jha is a 54 y.o. female who presents for Annual Exam (Patient has no pain or falls no complaints or concerns, last pap 24 wnl HPV negative last mammogram 24 benign no chaperone neeeded).  GAGAN Guillen is a 54-year-old female  2 para 2 well-known to the practice here for her annual exam.    She has a history of premature menopause now almost 20 years ago.  She has had a supracervical hysterectomy and is on transdermal estradiol 0.05 mg.  Happy with these results.    She is concerned about her overall cardiac health especially in light of her premature menopause.    Also would be eligible for a DEXA scan for bone density    She denies any urinary or bowel symptoms.  Overall feels well other than concerned about weight gain  Review of Systems   Constitutional:  Positive for unexpected weight change.   All other systems reviewed and are negative.      Objective   Physical Exam  Thyroid: No thyroid megaly    Cardiovascular: Regular rate and rhythm    Lungs: Clear to auscultation    Breasts: No skin changes no masses palpated    Abdomen: Soft nontender bowel sounds positive no masses palpated    Extremities nontender no edema    Pelvic exam: External genitalia Bartholin's urethra and Fromberg's are normal.  Vaginal exam shows no lesions or discharge.  Pelvic bimanual exam reveals no masses or tenderness.  Assessment/Plan   Normal annual physical exam    Refer to menopause specialist regarding cardiac health    Pap smear due in     Mammogram ordered    DEXA scan for bone density due to hypoestrogenism         Viktor Kingsley MD 25 12:26 PM

## 2025-07-30 ENCOUNTER — APPOINTMENT (OUTPATIENT)
Dept: OBSTETRICS AND GYNECOLOGY | Facility: CLINIC | Age: 55
End: 2025-07-30
Payer: COMMERCIAL

## 2025-07-30 VITALS
WEIGHT: 169 LBS | DIASTOLIC BLOOD PRESSURE: 83 MMHG | BODY MASS INDEX: 28.12 KG/M2 | SYSTOLIC BLOOD PRESSURE: 176 MMHG | HEART RATE: 82 BPM

## 2025-07-30 DIAGNOSIS — G47.09 TROUBLE GETTING TO SLEEP: ICD-10-CM

## 2025-07-30 DIAGNOSIS — G47.9 TROUBLE IN SLEEPING: ICD-10-CM

## 2025-07-30 DIAGNOSIS — R61 NIGHT SWEATS: ICD-10-CM

## 2025-07-30 DIAGNOSIS — R53.83 LACK OF ENERGY: ICD-10-CM

## 2025-07-30 DIAGNOSIS — R41.89 BRAIN FOG: ICD-10-CM

## 2025-07-30 DIAGNOSIS — R45.4 IRRITABILITY: ICD-10-CM

## 2025-07-30 DIAGNOSIS — R53.83 OTHER FATIGUE: ICD-10-CM

## 2025-07-30 DIAGNOSIS — N39.3 STRESS INCONTINENCE: ICD-10-CM

## 2025-07-30 DIAGNOSIS — L65.9 HAIR THINNING: ICD-10-CM

## 2025-07-30 DIAGNOSIS — L67.9 ABNORMALITY OF HAIR GROWTH: ICD-10-CM

## 2025-07-30 DIAGNOSIS — R63.5 WEIGHT GAIN: ICD-10-CM

## 2025-07-30 DIAGNOSIS — M25.50 GENERALIZED JOINT PAIN: ICD-10-CM

## 2025-07-30 DIAGNOSIS — F41.9 ANXIETY: ICD-10-CM

## 2025-07-30 DIAGNOSIS — N95.1 HOT FLASH, MENOPAUSAL: Primary | ICD-10-CM

## 2025-07-30 DIAGNOSIS — N95.8 GENITOURINARY SYNDROME OF MENOPAUSE: ICD-10-CM

## 2025-07-30 PROCEDURE — 99215 OFFICE O/P EST HI 40 MIN: CPT | Performed by: OBSTETRICS & GYNECOLOGY

## 2025-07-30 RX ORDER — ESTRADIOL 0.1 MG/G
2 CREAM VAGINAL NIGHTLY
Qty: 34 G | Refills: 3 | Status: SHIPPED | OUTPATIENT
Start: 2025-07-30 | End: 2026-07-30

## 2025-07-30 RX ORDER — ESTRADIOL 0.05 MG/D
1 PATCH TRANSDERMAL WEEKLY
Qty: 12 PATCH | Refills: 3 | Status: SHIPPED | OUTPATIENT
Start: 2025-07-30 | End: 2026-07-30

## 2025-07-30 ASSESSMENT — ENCOUNTER SYMPTOMS
CARDIOVASCULAR NEGATIVE: 0
NEUROLOGICAL NEGATIVE: 0
HEMATOLOGIC/LYMPHATIC NEGATIVE: 0
MUSCULOSKELETAL NEGATIVE: 0
ALLERGIC/IMMUNOLOGIC NEGATIVE: 0
EYES NEGATIVE: 0
PSYCHIATRIC NEGATIVE: 0
GASTROINTESTINAL NEGATIVE: 0
LOSS OF SENSATION IN FEET: 0
CONSTITUTIONAL NEGATIVE: 0
OCCASIONAL FEELINGS OF UNSTEADINESS: 0
ENDOCRINE NEGATIVE: 0
RESPIRATORY NEGATIVE: 0
DEPRESSION: 0

## 2025-07-30 ASSESSMENT — COLUMBIA-SUICIDE SEVERITY RATING SCALE - C-SSRS
2. HAVE YOU ACTUALLY HAD ANY THOUGHTS OF KILLING YOURSELF?: NO
6. HAVE YOU EVER DONE ANYTHING, STARTED TO DO ANYTHING, OR PREPARED TO DO ANYTHING TO END YOUR LIFE?: NO
1. IN THE PAST MONTH, HAVE YOU WISHED YOU WERE DEAD OR WISHED YOU COULD GO TO SLEEP AND NOT WAKE UP?: NO

## 2025-07-30 ASSESSMENT — PATIENT HEALTH QUESTIONNAIRE - PHQ9
SUM OF ALL RESPONSES TO PHQ9 QUESTIONS 1 AND 2: 0
2. FEELING DOWN, DEPRESSED OR HOPELESS: NOT AT ALL
1. LITTLE INTEREST OR PLEASURE IN DOING THINGS: NOT AT ALL

## 2025-07-30 ASSESSMENT — PAIN SCALES - GENERAL: PAINLEVEL_OUTOF10: 0-NO PAIN

## 2025-07-30 NOTE — PROGRESS NOTES
SUBJECTIVE:  HPI: Mindy Jha is a  who presents for menopause consult    Consultation requested by Dr. Kingsley for an opinion regarding menopause. My final recommendations will be communicated back to the requesting provider by way of shared medical record or US mail.           REVISED MenQOL  sm            COMPLAINT                                                                     Not at all bothered       1  2  3  4  5       Extremely Bothered      Hot flashes       YES                                    No     Yes           à               1   2   3   4   5        Night sweats    YES                                    No     Yes           à               1   2   3   4   5        Difficulty falling asleep  YES 2                      No     Yes           à               1   2   3   4   5    better   Difficulty staying asleep     YES 3                 No     Yes           à               1   2   3   4   5    but very light sleeper  Feeling anxious or nervous  YES 3               No     Yes           à               1   2   3   4   5      Experiencing poor memory   YES 3              No     Yes           à               1   2   3   4   5      Feeling depressed, down or blue   NO      No     Yes           à               1   2   3   4   5      Being impatient with others      YES 2          No     Yes           à               1   2   3   4   5      Aching muscles or joints    YES 2                 No     Yes           à               1   2   3   4   5      Feeling tired or worn out    YES 2                No     Yes           à               1   2   3   4   5      Feeling lack of energy      YES 3                    No     Yes           à               1   2   3   4   5      Dry skin YES 2                                               No     Yes           à               1   2   3   4   5      Weight gain    YES                                       No     Yes           à               1   2   3    4   5      Increased facial hair     YES 3                        No     Yes           à               1   2   3   4   5      Changes to texture or tone of skin NO     No     Yes           à               1   2   3   4   5      Frequent urination NO                              No     Yes           à               1   2   3   4   5      Leaking urine with coughing, laughing YES 1 No     Yes           à               1   2   3   4   5      Unable to hold urine   NO                         No     Yes           à               1   2   3   4   5      Vaginal dryness YES 3                                  No     Yes           à               1   2   3   4   5      Changes to sexual desire    NO                No     Yes           à               1   2   3   4   5      Discomfort or pain with intercourse  NO  No     Yes           à               1   2   3   4   5      Constipation    NO                                     No     Yes           à               1   2   3   4   5     Headache       NO                                     No     Yes           à               1   2   3   4   5      Hair thinning on head   YES 2                      No     Yes           à               1   2   3   4   5            Most bothersome? MEMORY and sleep  When did it start? ABOUT A YEAR AGO      Hair changes are not new  Diet and exercise (how often, # min, type) active    SYMPTOMS  Hysterectomy: Y/N yes 2003    Previous treatments/interventions-Has been on climara since age 34         TRIED/OFFERED:  Rx/Non-prescription interventions (what additional supplements are they taking and for what)     Magnesium for sleep        RISK ASSESSMENT (see link for full ROS)    Problem List[1]    POI-surgical  Overall pt worried that her health changes are related to premature menopause and wanting to know if anythng could have been prevented or would have been different        GYN HX/HORMONAL HISTORY:  Routine GYN care clinician: Dr. Teetee Nichole  seen 2025     LMP age 34  Menopause age 34-surgical menopause, severe endo    Hysterectomy: yes  Ovaries removed: yes    Medical History[2]  Surgical History[3]  Problem List[4]    Current Medications[5]      I reviewed, entered and updated pertinent portions of the record including medical, gynecologic, obstetrical, social, family, and surgical history along with active gyn medications list and allergies.    ROS:  See HPI        OBJECTIVE:  /83   Pulse 82   Wt 76.7 kg (169 lb)   LMP  (LMP Unknown)   BMI 28.12 kg/m²       Labs:  The ASCVD Risk score (Felix MULLER, et al., 2019) failed to calculate for the following reasons:    Cannot find a previous HDL lab    Cannot find a previous total cholesterol lab    Coronary calcium score=1    Tsh  hgbA1c  Vit D=33        ASSESSMENT  Encounter Diagnoses   Name Primary?    Hot flash, menopausal Yes    Trouble getting to sleep     Irritability     Anxiety     Brain fog     Lack of energy     Weight gain     Night sweats     Hair thinning     Stress incontinence     Trouble in sleeping     Other fatigue     Generalized joint pain     Abnormality of hair growth     Genitourinary syndrome of menopause            PLAN  1)    --menopause.  Reviewed average age at menopause, definition of menopause (cessation of ovarian function).  Reviewed common symptons--vaginal dryness, vaginal irritation, frequent urinary pressure/urination sensation, dyspareunia, hot flashes, trouble sleeping, mood swings, irritability, thinning hair, decreased metabolism, forgetfullness.  Also reviewed in depth the changes in sexual function that often accompany menopause--dyspareunia, decreased vaginal sensation, decresaed ability to achieve orgasm, decreased lubrication, decreased mental and physical arousal.  Also discussed menopause and aging, osteoporosis-related to decrease and then lalck of estrogen, heart disease, decrease in skin elasticity.  Referred pt to the NAMS website for patients.    2)  The approach to caring for women in menopause is to address all the health changes that you feel as well as those that you may NOT feel but that are either affecting your body now or will in the future.  The goal would be to optimize your health to prevent medical conditions from developing or progressing.  Since menopause affects women from head to toe, we need a team to take care of every part.  Our team consists of a nutritionists, a gynecologist, a behavioral health therapist and a Family medicine physician.  Sometimes we also refer to other specialists when needed.    3) POI/POF--reviewed increased risk of heart disease, osteoporosis, alzheimers.  Explained the conditions accelerates the aging process.  HRT/MERLINE help mntn E2/P levels at a more pre-menopausal level to protect bones, heart.  typicaly the younger one goes thru POI/POF the higher the dosing is.  And at natural age of menopause 52 then the discussion of syx and r/b/a would occur to decide if continuing on HRT/MERLINE would have benefit.  Also, annually review of menopause syx, medication side effefcts and PMH and heart health would be indicated to see if continuing HRT/MERLINE is indicated or if dosing should be adjusted.  Goal level of E2 100-150    Explained that she has been onERT since surgical menopause and that there is not a lot of data on ideal estrogen level and bone/heart/brain protection.  The data that does exist notes that even very low doses of ERT are protective for osteoporosis.  And higher doses over age 65 may be detrimental to dementia/alzheimers.     Overall approach now Would cont ERT for now and try to optimize health  Referred to [facility] for memory, sleep, fatigue, weight  #)   #) Trouble sleeping--  Causes of poor sleep---fragmented sleep or interrupted sleep can be the result of being woken up by night sweats, or a results of other medical conditions (incontinence, diabetes, HILL) or social issues (partner who snores,  dogs, noisy environment). Trouble sleeping can occur with menopause with or without night sweats.  Side effects of medications or alcohol    Consequences of poor sleep--    --increased CVD risk    --poor sleep quality associated with difficulty regulating weight because  ---fragmented sleep, or decreased REM cycles->increase cortisol (this is the stress hormone) ->this decreases metabolism->thus increasing weight gain  --Sleep is improtant for muscle metabolism/recovery--decrease muscle mass->decrease metabolism->increase weight    --sleep deprivation increases craving for carbohydrates                 Recommendations;  Goal is 7-9 hours  Reviewed importance of good sleep habits       -do not sleep with light on (should be dark and quiet)              -do not nap             - omega 3 FA associated with insomnia (if taking try to take them in am instead)   -Sleeping corina     Treatments   -magnesium supplement given   -referred to dr Ramsay    #) Weight gain in menopause  -several things contribute to weight gain in menopause  Natural decrease in metabolism with age, levels off after 70's.     Reviewed the association between weight and VMS: weight loss associated with improvement in VMS and weight gain associated with increase/worsening VMS      1) decrease in muscle mass--less muscle-> decreased metabolism-> weight gain  2) sleep disruption (from any thing night sweats/HILL/incontinence/snoring partner) -> increase cortisol->decrease metabolsim->weight gain   A) and increase craving for carbohydrates with sleep deprivation  3) menopause syx--cause fatigue and lack of energy/motivation to work out or eat healthy  4) life stressors--decrease metabolism (similar to increased cortisol with lack of sleep) and decrease motivation to exercise or lack of time  5) change in gut amanda (bacteria) and this leads to reduces metabolism, decreases efficiency of food absorption and contributes to weight gain.  Probiotic  recommendations given  Although there is NO data yet that says taking a probiotic would help--that does seem a logical thing to do.  6) viscious cycle of menopause syx contributing to weight gain  Night sweats-->tired-->fatigue--> no energy to exercise--> lack of sleep contributing to poor eating choices (craving carbs)    Estrogen is involved in metabolism and after menopause the body is deprived of estrogen so to give the body back estrogen that is gone it slows metabolism to put fat on the trunk--so that there is conversion of fat to estrogen (estrone)   Reviewed accumulation of internal adipose not subQ with menopause  Reviewed HRT studies have not been shown to increase weight but that women on HRT gain less weight than those not on HRT and have an eaiser time mntn weight    Reeivewed testosterone supplementation is NOT indicated for weight loss.      Menoweight loss  WEIGHT LOSS Diet, resistance exercise, healthy sleep, stress reduction  Most improvements noted with multi-disciplinary approach with nutrition/dietician, behavioral health, lifestyle interventions, weight management (may be candidate for medications, bariatric sx)      Decrease calorie intake. Goal 1200 isai/day  Reduce sweets, salt, alcohol, nutrient poor foods, portions and high carb diets  Increase vegetables (fruits), healthy proteins, fish  DIET: Mediterranean diet has been shown to reduce intensity and frequency of hot flashes.          shown to be the best diet to reduce obesity and maintain weight  Other benefits of mediterranean diet: Decrease mortality, cancer, CVD, respiratory dz, glucose levels, BP,  chronic pain.  To be discussed further with nutrition if patient interested in referral      Excercise  -exercise is important for maintaining weight, not losing weight   Increase muscle mass=increased metabolsim  Increase activity,   reviewed resistance exercise--bands, weights, body weight      goal >150-250 minutes/week  Reviewed  methods to get exercise in during daily schedule that works for the individual.  Other benefit of exercise and weight loss      frequent high intensity exercise associated with lowest breast cancer risk      RECS: refer to Todd    Book Review: Next Level by Brandi Vitale, PhD & Lorna Peguero  Writer: Celina Maurice, MS, CNS, LDN, RH  Celina Kaylene, MS, CNS, LDN, RH          Brandi Vitale, PhD, is super hot right now -- you’ve probably heard her name. She’s an exercise physiologist and nutrition  specializing in female athletes. Lorna Peguero is a professional cyclist with a podcast called [employer], and her brand is Feisty Menopause. Together, these two offer excellent advice for sports enthusiasts, athletes, and physically active women navigating through yolis and post menopause.      #) hair thinning--.reviewed this is a combination of many factors including: genetic programming of hair loss/thinning, aging, menopause and decrease in hormones.    The only options that have evidence to support their recommendation are  Male doses of Minoxidil oral 1.25-2.5 mg/d, 30% risk of facial hair  Minoxidil topical   These are typically evaluated and prescribed by dermatology  Other recommendations that do not have significant evidence are:    Biotin 20,000U, folate 4 mg daily   Increase protein in diet 120 gm/day  Zn+ or a multi vitamin  vitD +K2 and iron  Scalp stim-nylon brush (dry hair)-1 min every day (exfoliate)  Gather hair weekly to see if improving  Satin pillow case  Water 8- 8oz day  Speaking to nutritionist/dietician is often helpful to assess your diet for other nutrient deficiencies    #) Menopause and fatigue  Major contributor is sleep deprivation. IF sleep is interrupted for any reason (VMS/snoring partner/pets/ noise) this impacts energy.  Vicious cycle of VMS. Hot flashes/night sweats--> trouble sleeping--> irritablility---> fatigue and lack of motivation  Another major factor associated with  fatigue is life stresses or perceived stress and mood disorders  Also, this is a time of developing medical condition and the need to evaluate for these  Vit D deficiency  Vit B12  Anemia  HILL  Thyroid disease  Chronic fatigue syndrome  Work up negative--refer to david      #) --reveiwed syx of GSM--vaginal irritation, itching, dryness, d/c, discomfort, mild to sever dyspareunia, urinary frequency, urinary urgency, urinary irritation or sense of pelvic pressure.  syx can be externally and inside vagina.  Explained the process is result of lack of estrogen, and sometimes decreased androgens (testosterone) and that it is a progressive condition with aging.  This is a chronic and progressive condition, and if symptomatic will likely get worse without treatment.  Standard treatments age vaginal moisturizers (replens or coconut oil-internally and externally daily) and vaginal estrogens and other hormone treatments (prasterone/DHEA, Ospemiphene).  Vaginal estrogens come in a variety of preparations--cream, tablet, capsule, ring and that all but the ring is the same frequency of use depending on severity of presenting symptoms and duration.  Explained that with sexual pain, often need to use estrogen cream so the outside of the vagina (vulva) also gets sufficient amount of estrogen. Explained typical use with vaginal estrogen.  Instructions for vaginal estrogen: Use every night for 2 weeks and then twice a week. It is ok if doses are missed.  If several months go by without using, you may need to repeat the 2 weeks every night again.    Reviewed that despite the black box warning on the container--vaginal estrogen has never been shown to increase the risk of breast cancer.  This is a local product for the vagina and is only supplementing in a very low dose the estrogen that use to be made.  Besides allergic reaction to the cream or substrate that the estrogen is dissolved in or the mess, other side effects of breast  tenderness, vaginal bleeding and nausea are uncommon.    Typically takes 6-8 weeks to note improvement.  Pt referred to The Menopause Society website for patient      Vag E2 given    F/up with primary GYN               Discussed health maintenance including importance of daily exercise and diet modifications, monthly breast exams, avoidance of excessive alcohol and smoking, safe sex practices, and multivitamin/calcium supplementation.  Pt education and instruction regarding diagnosis, treatment, and follow up were discussed.  Pt understands the plan and all her questions answered.    Luzmaria Machuca MD               [1] There is no problem list on file for this patient.  [2]   Past Medical History:  Diagnosis Date    Endometriosis January 2003   [3]   Past Surgical History:  Procedure Laterality Date    ADENOIDECTOMY  05/15/2015    Adenoidectomy    APPENDECTOMY  Feb 2003    HYSTERECTOMY  08/23/2014    Supracervical Hysterectomy    OOPHORECTOMY  Feb 2003    TONSILLECTOMY  03/09/2015    Tonsillectomy   [4] There is no problem list on file for this patient.  [5]   Current Outpatient Medications:     amlodipine-atorvastatin (Caduet) 5-10 mg tablet, Take 1 tablet by mouth once daily., Disp: , Rfl:     calcium carbonate-vitamin D3 600 mg-20 mcg (800 unit) tablet, Take 1 tablet by mouth once daily., Disp: , Rfl:     clindamycin (Cleocin T) 1 % lotion, Apply topically 2 times a day. To new acne bumps as needed, Disp: 60 mL, Rfl: 11    diclofenac (Voltaren) 75 mg EC tablet, Take 1 tablet (75 mg) by mouth 2 times a day as needed (pain). Do not crush, chew, or split., Disp: 30 tablet, Rfl: 0    diclofenac sodium (Voltaren) 1 % gel, Apply 4.5 inches (4 g) topically 4 times a day., Disp: , Rfl:     estradiol (Climara) 0.05 mg/24 hr patch, PLACE 1 PATCH ON THE SKIN ONCE PER WEEK, Disp: 12 patch, Rfl: 0

## 2025-07-30 NOTE — PROGRESS NOTES
LMP  Hysterectomy: Y/N yes 2003  Number of years of menopause symptoms (typically just VMS)  Previous treatments/interventions  Non-prescription interventions (what additional supplements are they taking and for what)  Diet and exercise (how often, #min, type)    Does patient have questions or concerns about:  Breast health/cancer  Osteoporosis  Heart disease      REVISED MenQOL 2025 sm            COMPLAINT                                                                     Not at all bothered       1  2  3  4  5       Extremely Bothered      Hot flashes       YES                                    No     Yes           à               1   2   3   4   5        Night sweats    YES                                    No     Yes           à               1   2   3   4   5        Difficulty falling asleep  YES 2                      No     Yes           à               1   2   3   4   5         Difficulty staying asleep     YES 3                 No     Yes           à               1   2   3   4   5         Feeling anxious or nervous  YES 3               No     Yes           à               1   2   3   4   5         Experiencing poor memory   YES 3              No     Yes           à               1   2   3   4   5         Feeling depressed, down or blue   NO      No     Yes           à               1   2   3   4   5         Being impatient with others      YES 2          No     Yes           à               1   2   3   4   5         Aching muscles or joints    YES 2                 No     Yes           à               1   2   3   4   5         Feeling tired or worn out    YES 2                No     Yes           à               1   2   3   4   5         Feeling lack of energy      YES 3                    No     Yes           à               1   2   3   4   5         Dry skin YES 2                                               No     Yes           à               1   2   3   4   5         Weight gain    YES                                        No     Yes           à               1   2   3   4   5         Increased facial hair     YES 3                        No     Yes           à               1   2   3   4   5         Changes to texture or tone of skin NO     No     Yes           à               1   2   3   4   5         Frequent urination NO                              No     Yes           à               1   2   3   4   5         Leaking urine with coughing, laughing YES 1 No     Yes           à               1   2   3   4   5         Unable to hold urine   NO                         No     Yes           à               1   2   3   4   5         Vaginal dryness YES 2                                  No     Yes           à               1   2   3   4   5         Changes to sexual desire    NO                No     Yes           à               1   2   3   4   5         Discomfort or pain with intercourse  NO  No     Yes           à               1   2   3   4   5         Constipation    NO                                     No     Yes           à               1   2   3   4   5         Headache       NO                                     No     Yes           à               1   2   3   4   5         Hair thinning on head   YES 2                      No     Yes           à               1   2   3   4   5            Most bothersome? MEMORY   When did it start? ABOUT A YEAR AGO

## 2025-07-31 ENCOUNTER — APPOINTMENT (OUTPATIENT)
Dept: DERMATOLOGY | Facility: CLINIC | Age: 55
End: 2025-07-31
Payer: COMMERCIAL

## 2025-07-31 ENCOUNTER — TELEPHONE (OUTPATIENT)
Dept: SLEEP MEDICINE | Facility: CLINIC | Age: 55
End: 2025-07-31

## 2025-07-31 ENCOUNTER — HOSPITAL ENCOUNTER (OUTPATIENT)
Dept: RADIOLOGY | Facility: HOSPITAL | Age: 55
Discharge: HOME | End: 2025-07-31
Payer: COMMERCIAL

## 2025-07-31 DIAGNOSIS — Z12.31 ENCOUNTER FOR SCREENING MAMMOGRAM FOR BREAST CANCER: ICD-10-CM

## 2025-07-31 PROCEDURE — 77063 BREAST TOMOSYNTHESIS BI: CPT

## 2025-07-31 NOTE — TELEPHONE ENCOUNTER
----- Message from René Ramsay sent at 7/31/2025  7:45 AM EDT -----  Please schedule. Thanks!  ----- Message -----  From: Luzmaria Carrizales MD, MS  Sent: 7/30/2025   4:55 PM EDT  To: Olinda Somers; René Ramsay MD    Another referral  Premature surgical menopause--has been on estrogen  Many menopause related issues

## 2025-08-01 ENCOUNTER — TELEPHONE (OUTPATIENT)
Dept: SLEEP MEDICINE | Facility: CLINIC | Age: 55
End: 2025-08-01
Payer: COMMERCIAL

## 2025-08-01 NOTE — TELEPHONE ENCOUNTER
----- Message from René Ramsay sent at 8/1/2025  3:48 PM EDT -----  Please schedule. Thanks!  ----- Message -----  From: Luzmaria Carrizales MD, MS  Sent: 7/30/2025   4:55 PM EDT  To: Olinda Somers; René Ramsay MD    Another referral  Premature surgical menopause--has been on estrogen  Many menopause related issues

## 2025-09-08 ENCOUNTER — APPOINTMENT (OUTPATIENT)
Dept: OBSTETRICS AND GYNECOLOGY | Facility: CLINIC | Age: 55
End: 2025-09-08
Payer: COMMERCIAL

## 2025-09-22 ENCOUNTER — APPOINTMENT (OUTPATIENT)
Dept: INTEGRATIVE MEDICINE | Facility: CLINIC | Age: 55
End: 2025-09-22
Payer: COMMERCIAL